# Patient Record
Sex: MALE | Race: WHITE | NOT HISPANIC OR LATINO
[De-identification: names, ages, dates, MRNs, and addresses within clinical notes are randomized per-mention and may not be internally consistent; named-entity substitution may affect disease eponyms.]

---

## 2024-03-21 PROBLEM — Z00.00 ENCOUNTER FOR PREVENTIVE HEALTH EXAMINATION: Status: ACTIVE | Noted: 2024-03-21

## 2024-04-01 ENCOUNTER — NON-APPOINTMENT (OUTPATIENT)
Age: 54
End: 2024-04-01

## 2024-04-02 ENCOUNTER — NON-APPOINTMENT (OUTPATIENT)
Age: 54
End: 2024-04-02

## 2024-04-02 ENCOUNTER — APPOINTMENT (OUTPATIENT)
Dept: ORTHOPEDIC SURGERY | Facility: CLINIC | Age: 54
End: 2024-04-02
Payer: COMMERCIAL

## 2024-04-02 VITALS — BODY MASS INDEX: 25.9 KG/M2 | HEIGHT: 67 IN | WEIGHT: 165 LBS | RESPIRATION RATE: 16 BRPM

## 2024-04-02 DIAGNOSIS — M25.522 PAIN IN LEFT ELBOW: ICD-10-CM

## 2024-04-02 DIAGNOSIS — Z78.9 OTHER SPECIFIED HEALTH STATUS: ICD-10-CM

## 2024-04-02 DIAGNOSIS — M65.311 TRIGGER THUMB, RIGHT THUMB: ICD-10-CM

## 2024-04-02 PROCEDURE — 99204 OFFICE O/P NEW MOD 45 MIN: CPT | Mod: 25

## 2024-04-02 PROCEDURE — 20550 NJX 1 TENDON SHEATH/LIGAMENT: CPT

## 2024-04-02 RX ORDER — ATORVASTATIN CALCIUM 80 MG/1
TABLET, FILM COATED ORAL
Refills: 0 | Status: ACTIVE | COMMUNITY

## 2024-04-02 NOTE — PHYSICAL EXAM
[de-identified] : He is tender over A1 pulley right thumb with triggering and locking.  Lacks thumb IP hyperextension.  Large olecranon osteophyte bilateral posterior elbows.  Well-healed medial elbow incision from prior submuscular ulnar nerve transposition [de-identified] : PA lateral of both elbows demonstrate a large olecranon osteophyte bilaterally.  PA lateral of both thumbs do not show any acute osseous abnormality

## 2024-04-02 NOTE — HISTORY OF PRESENT ILLNESS
[Right] : right hand dominant [FreeTextEntry1] : Patient presents for evaluation of his chronic right thumb clicking, locking, pain which began about 6 weeks ago without trauma.  He has not had any treatment for this. Patient also presents for evaluation of his chronic left elbow olecranon pain without swelling which worsened about a month ago but began several months ago.  He attributes the symptoms to working out.  There has no specific trauma.  He has not had any treatment for this as well, he has history of left cubital tunnel decompression several years ago.  He denies any numbness or tingling.  Patient is traveling to [place] in the next couple days.

## 2024-04-02 NOTE — ASSESSMENT
[FreeTextEntry1] : My impression is that this patient has stenosing tenosynovitis of the right thumb, more commonly known as a trigger finger.  I recommended that the patient undergo a trigger finger injection. The risks, benefits, and alternatives were discussed with the patient in detail. This included (but was not limited to) pain, infection, subcutaneous atrophy, skin depigmentation, etc... They agreed to undergo the steroid injection. Under informed consent and sterile conditions, 1/2 cc of 2% plain lidocaine  and 1/2 cc of Kenalog 10 was precisely injected into the patient's thumb.   A sterile Band-Aid was placed.  It is my hope that this significantly alleviates the patient's symptoms.  Patient has mildly symptomatic left olecranon osteophyte.  Symptoms are only intermittent so he is going to observe it.  Discussed options for treatment if it bothers him enough which include excision of his olecranon osteophyte.  For now he is going to observe it